# Patient Record
Sex: FEMALE | Race: BLACK OR AFRICAN AMERICAN | NOT HISPANIC OR LATINO | Employment: FULL TIME
[De-identification: names, ages, dates, MRNs, and addresses within clinical notes are randomized per-mention and may not be internally consistent; named-entity substitution may affect disease eponyms.]

---

## 2017-12-24 ENCOUNTER — HEALTH MAINTENANCE LETTER (OUTPATIENT)
Age: 32
End: 2017-12-24

## 2019-03-11 ENCOUNTER — AMBULATORY - HEALTHEAST (OUTPATIENT)
Dept: OTHER | Facility: CLINIC | Age: 34
End: 2019-03-11

## 2020-03-10 ENCOUNTER — HEALTH MAINTENANCE LETTER (OUTPATIENT)
Age: 35
End: 2020-03-10

## 2020-12-20 ENCOUNTER — HEALTH MAINTENANCE LETTER (OUTPATIENT)
Age: 35
End: 2020-12-20

## 2021-04-24 ENCOUNTER — HEALTH MAINTENANCE LETTER (OUTPATIENT)
Age: 36
End: 2021-04-24

## 2021-05-29 ENCOUNTER — RECORDS - HEALTHEAST (OUTPATIENT)
Dept: ADMINISTRATIVE | Facility: CLINIC | Age: 36
End: 2021-05-29

## 2021-06-01 ENCOUNTER — RECORDS - HEALTHEAST (OUTPATIENT)
Dept: ADMINISTRATIVE | Facility: CLINIC | Age: 36
End: 2021-06-01

## 2021-06-05 ENCOUNTER — RECORDS - HEALTHEAST (OUTPATIENT)
Dept: SCHEDULING | Facility: CLINIC | Age: 36
End: 2021-06-05

## 2021-06-05 DIAGNOSIS — Z33.1 PREGNANT STATE, INCIDENTAL: ICD-10-CM

## 2021-10-03 ENCOUNTER — HEALTH MAINTENANCE LETTER (OUTPATIENT)
Age: 36
End: 2021-10-03

## 2022-09-10 ENCOUNTER — HEALTH MAINTENANCE LETTER (OUTPATIENT)
Age: 37
End: 2022-09-10

## 2022-12-18 ENCOUNTER — HOSPITAL ENCOUNTER (EMERGENCY)
Facility: CLINIC | Age: 37
Discharge: HOME | End: 2022-12-18
Attending: EMERGENCY MEDICINE | Admitting: EMERGENCY MEDICINE
Payer: COMMERCIAL

## 2022-12-18 VITALS
OXYGEN SATURATION: 100 % | DIASTOLIC BLOOD PRESSURE: 65 MMHG | RESPIRATION RATE: 16 BRPM | SYSTOLIC BLOOD PRESSURE: 120 MMHG | TEMPERATURE: 98.3 F | HEART RATE: 82 BPM

## 2022-12-18 DIAGNOSIS — G43.809 OTHER MIGRAINE WITHOUT STATUS MIGRAINOSUS, NOT INTRACTABLE: ICD-10-CM

## 2022-12-18 PROCEDURE — 96361 HYDRATE IV INFUSION ADD-ON: CPT

## 2022-12-18 PROCEDURE — 96374 THER/PROPH/DIAG INJ IV PUSH: CPT

## 2022-12-18 PROCEDURE — 99284 EMERGENCY DEPT VISIT MOD MDM: CPT | Mod: 25

## 2022-12-18 PROCEDURE — 96375 TX/PRO/DX INJ NEW DRUG ADDON: CPT

## 2022-12-18 PROCEDURE — 258N000003 HC RX IP 258 OP 636: Performed by: EMERGENCY MEDICINE

## 2022-12-18 PROCEDURE — 250N000011 HC RX IP 250 OP 636: Performed by: EMERGENCY MEDICINE

## 2022-12-18 RX ORDER — BUTALBITAL, ACETAMINOPHEN AND CAFFEINE 50; 325; 40 MG/1; MG/1; MG/1
1 TABLET ORAL EVERY 4 HOURS PRN
Qty: 12 TABLET | Refills: 0 | Status: SHIPPED | OUTPATIENT
Start: 2022-12-18

## 2022-12-18 RX ORDER — ONDANSETRON 2 MG/ML
4 INJECTION INTRAMUSCULAR; INTRAVENOUS ONCE
Status: COMPLETED | OUTPATIENT
Start: 2022-12-18 | End: 2022-12-18

## 2022-12-18 RX ORDER — KETOROLAC TROMETHAMINE 15 MG/ML
15 INJECTION, SOLUTION INTRAMUSCULAR; INTRAVENOUS ONCE
Status: COMPLETED | OUTPATIENT
Start: 2022-12-18 | End: 2022-12-18

## 2022-12-18 RX ORDER — ONDANSETRON 4 MG/1
4 TABLET, ORALLY DISINTEGRATING ORAL EVERY 8 HOURS PRN
Qty: 10 TABLET | Refills: 0 | Status: SHIPPED | OUTPATIENT
Start: 2022-12-18 | End: 2022-12-21

## 2022-12-18 RX ADMIN — ONDANSETRON 4 MG: 2 INJECTION INTRAMUSCULAR; INTRAVENOUS at 07:06

## 2022-12-18 RX ADMIN — KETOROLAC TROMETHAMINE 15 MG: 15 INJECTION, SOLUTION INTRAMUSCULAR; INTRAVENOUS at 07:06

## 2022-12-18 RX ADMIN — SODIUM CHLORIDE 1000 ML: 9 INJECTION, SOLUTION INTRAVENOUS at 07:05

## 2022-12-18 ASSESSMENT — ENCOUNTER SYMPTOMS
DIZZINESS: 0
DIAPHORESIS: 0
HEADACHES: 1
PHOTOPHOBIA: 1
FEVER: 0
NAUSEA: 1
VOMITING: 1
CHILLS: 0
ABDOMINAL PAIN: 0
DIARRHEA: 0
COUGH: 0

## 2022-12-18 ASSESSMENT — ACTIVITIES OF DAILY LIVING (ADL)
ADLS_ACUITY_SCORE: 35
ADLS_ACUITY_SCORE: 35

## 2022-12-18 NOTE — ED TRIAGE NOTES
Here for a left sided headache that started yesterday, endorses photophobia, nausea, and vomiting   Hx migraines, per patient   Has tried OTC medications with no relief or improvement        Triage Assessment     Row Name 12/18/22 0643       Triage Assessment (Adult)    Airway WDL WDL       Respiratory WDL    Respiratory WDL WDL       Skin Circulation/Temperature WDL    Skin Circulation/Temperature WDL WDL       Cardiac WDL    Cardiac WDL WDL       Peripheral/Neurovascular WDL    Peripheral Neurovascular WDL WDL       Cognitive/Neuro/Behavioral WDL    Cognitive/Neuro/Behavioral WDL X  headache on left side

## 2022-12-18 NOTE — ED PROVIDER NOTES
EMERGENCY DEPARTMENT ENCOUNTER      NAME: Aurea Cervantes  AGE: 37 year old female  YOB: 1985  MRN: [id number]  EVALUATION DATE & TIME: 12/18/2022  6:39 AM    PCP: Hollie Burciaga (Inactive)    ED PROVIDER: Mary Jane Barreto M.D.      Chief Complaint   Patient presents with     Headache       FINAL IMPRESSION:  1. Other migraine without status migrainosus, not intractable        ED COURSE & MEDICAL DECISION MAKING:    Pertinent Labs & Imaging studies reviewed. (See chart for details)  37 year old female presents to the Emergency Department for evaluation of headache.  She reports a history of migraine headaches and states she gets 1 every few months.  This 1 started yesterday.  It is localized to the left side of her head and is a throbbing pain.  She reports that she typically has blurry vision with her migraine headache and she is experiencing that similar blurry vision in her left eye today.  She also states she gets decreased hearing on the side of her migraine headaches and has decreased hearing in her left ear.  She has associated nausea and has been vomiting.  On my exam, she has no focal neurologic deficits.  She has had no constitutional symptoms.  This is an acute exacerbation of her migraine headaches.  Patient improved with IV fluids, IV Toradol, IV ondansetron.  She is comfortable with discharge home and request something to take at home.  Medications were prescribed for nausea and pain, I also encouraged her to follow-up with her primary care provider if her migraine headaches become more frequent or if her symptoms continue, return to the emergency department symptoms acutely worsen.    6:44 AM I met with the patient, obtained history, performed an initial exam, and discussed options and plan for diagnostics and treatment here in the ED.  7:44 AM Rechecked and reevaluated the patient. She is feeling better, currently rates headache 6/10 and nausea has resolved.  8:18 AM Rechecked  and reevaluated the patient. She says she is feeling much better, headache now 2/10.    Medical Decision Making    History:    Supplemental history from: Documented in HPI, if applicable    External Record(s) reviewed: Documented in HPI, if applicable.    Work Up:    Chart documentation includes differential considered and any EKGs or imaging interpreted by provider.    In additional to work up documented, I considered the following work up: See chart documentation, if applicable.    External consultation:    Discussion of management with another provider: See chart documentation, if applicable    Complicating factors:    Care impacted by chronic illness: Other: migraine headaches    Care affected by social determinants of health: N/A    Disposition considerations: Discharge. I prescribed additional prescription strength medication(s) as charted. Admission consideration documented above, if applicable.      At the conclusion of the encounter I discussed the results of all of the tests and the disposition. The questions were answered. The patient or family acknowledged understanding and was agreeable with the care plan.      MEDICATIONS GIVEN IN THE EMERGENCY:  Medications   0.9% sodium chloride BOLUS (1,000 mLs Intravenous New Bag 12/18/22 0705)   ketorolac (TORADOL) injection 15 mg (15 mg Intravenous Given 12/18/22 0706)   ondansetron (ZOFRAN) injection 4 mg (4 mg Intravenous Given 12/18/22 0706)       NEW PRESCRIPTIONS STARTED AT TODAY'S ER VISIT  New Prescriptions    BUTALBITAL-ACETAMINOPHEN-CAFFEINE (ESGIC) -40 MG TABLET    Take 1 tablet by mouth every 4 hours as needed for headaches or migraine    ONDANSETRON (ZOFRAN ODT) 4 MG ODT TAB    Take 1 tablet (4 mg) by mouth every 8 hours as needed for nausea     =================================================================    \[facility] [place] [facility]\""    Patient information was obtained from: patient    Use of : N/A    Aurea Cervantes is a 37 year old female with a  "pertinent history of migraines who presents to this ED by private car with  for evaluation of a headache. Patient reports a frontal and left sided headache that started yesterday, she took Tylenol with relief. Headache started again around 1:00 AM this morning. She took 2 more Tylenol without any relief this time. Describes the pain as throbbing in nature that feels consistent with her history of migraines. Endorses associated photophobia and sensitivity to noise. Denies any dizziness, but says that the vision in her left eye appears blurred and hearing is decreased in her left ear when she is having pain, which she reports is typical of her migraines. She reports that she has been nauseated and had multiple episodes of vomiting this morning as well. Having difficulty keeping down any p.o. intake. No abdominal pain or diarrhea.    She reports that she has suffered from migraines for a \"long time\" and says that they come and go every few months. Reports that she had been taking naproxen in the past, but it did not provide any relief so she stopped taking it. Now she typically just takes Tylenol or Excedrin for her headaches.    Patient otherwise denies any recent fevers, chills, diaphoresis. No cough or congestion.    Patient denies history of DM or asthma. She does not smoke. She denies any other complaints or concerns.      REVIEW OF SYSTEMS  Review of Systems   Constitutional: Negative for chills, diaphoresis and fever.   HENT: Positive for hearing loss (Decreased hearing on L). Negative for congestion.         +Sensitive to loud noise.   Eyes: Positive for photophobia and visual disturbance (L eye blurred).   Respiratory: Negative for cough.    Gastrointestinal: Positive for nausea and vomiting. Negative for abdominal pain and diarrhea.   Neurological: Positive for headaches (frontal and L sided). Negative for dizziness.   All other systems reviewed and are negative.      PAST MEDICAL HISTORY:  Past Medical " History:   Diagnosis Date     Abnormal Pap smear     2010, colp     NO ACTIVE PROBLEMS (aka NONE)        PAST SURGICAL HISTORY:  Past Surgical History:   Procedure Laterality Date     CERCLAGE CERVICAL Bilateral 11/14/2014    Procedure: CERCLAGE CERVICAL;  Surgeon: Miladys Valerio MD;  Location: [facility]     DILATION AND CURETTAGE         CURRENT MEDICATIONS:    butalbital-acetaminophen-caffeine (ESGIC) -40 MG tablet  ondansetron (ZOFRAN ODT) 4 MG ODT tab  acetaminophen (TYLENOL) 325 MG tablet  ibuprofen (ADVIL,MOTRIN) 400 MG tablet  polyethylene glycol (MIRALAX/GLYCOLAX) powder  Prenatal Vit-Fe Fumarate-FA (PRENATAL MULTIVITAMIN  PLUS IRON) 27-0.8 MG TABS  senna-docusate (SENOKOT-S;PERICOLACE) 8.6-50 MG per tablet  VITAMIN D, CHOLECALCIFEROL, PO      ALLERGIES:  No Known Allergies    FAMILY HISTORY:  Family History   Problem Relation Age of Onset     Breast Cancer Maternal Grandmother      Hypertension Mother        SOCIAL HISTORY:   Social History     Socioeconomic History     Marital status: Single   Substance and Sexual Activity     Alcohol use: No     Drug use: No     Sexual activity: Yes     Partners: Male       VITALS:  /74   Pulse 83   Temp 98.3  F (36.8  C) (Oral)   Resp 16   SpO2 100%     PHYSICAL EXAM   Gen:  Alert, awake, NAD  HENT:  Head atraumatic, PERRL, EOMI, no meningismus  Respiratory:  CTA, normal respiratory rate  Cardiovascular:  Regular rate and rhythm, no murmur  Abdomen:  Soft, nontender, normoactive bowel sounds  Musculoskeletal:  FROM in all extremities with no tenderness  Integument:  No rash, warm, dry  Neuro:  GCS 15, A & O x 3, CN II - XII intact,no pronator drift, no nystagmus, normal gait, +5/5 strength in all extremities      I, Julio Damian, am serving as a scribe to document services personally performed by Dr. Mary Jane Barreto MD, based on my observation and the provider's statements to me. I, Dr. Mary Jane Barreto MD attest that Julio Damian is acting in a  tom cabrera, has observed my performance of the services and has documented them in accordance with my direction.    Mary Jane Barreto M.D.  Emergency Medicine  [facility] EMERGENCY ROOM  [address] [id number]  [phone]  Dept: [phone]     Mary Jane Barreto MD  12/18/22 0815

## 2023-01-22 ENCOUNTER — HEALTH MAINTENANCE LETTER (OUTPATIENT)
Age: 38
End: 2023-01-22

## 2024-04-28 ENCOUNTER — HEALTH MAINTENANCE LETTER (OUTPATIENT)
Age: 39
End: 2024-04-28

## 2024-08-26 ENCOUNTER — LAB REQUISITION (OUTPATIENT)
Dept: LAB | Facility: CLINIC | Age: 39
End: 2024-08-26
Payer: COMMERCIAL

## 2024-08-26 DIAGNOSIS — Z23 ENCOUNTER FOR IMMUNIZATION: ICD-10-CM

## 2024-08-26 PROCEDURE — 86706 HEP B SURFACE ANTIBODY: CPT | Mod: ORL | Performed by: STUDENT IN AN ORGANIZED HEALTH CARE EDUCATION/TRAINING PROGRAM

## 2024-08-27 LAB
HBV SURFACE AB SERPL IA-ACNC: 205 M[IU]/ML
HBV SURFACE AB SERPL IA-ACNC: REACTIVE M[IU]/ML

## 2025-05-17 ENCOUNTER — HEALTH MAINTENANCE LETTER (OUTPATIENT)
Age: 40
End: 2025-05-17